# Patient Record
Sex: FEMALE | Race: WHITE | Employment: OTHER | URBAN - METROPOLITAN AREA
[De-identification: names, ages, dates, MRNs, and addresses within clinical notes are randomized per-mention and may not be internally consistent; named-entity substitution may affect disease eponyms.]

---

## 2022-06-25 ENCOUNTER — HOSPITAL ENCOUNTER (EMERGENCY)
Age: 67
Discharge: HOME | End: 2022-06-26
Payer: MEDICARE

## 2022-06-25 VITALS
OXYGEN SATURATION: 96 % | DIASTOLIC BLOOD PRESSURE: 78 MMHG | WEIGHT: 190 LBS | RESPIRATION RATE: 16 BRPM | SYSTOLIC BLOOD PRESSURE: 124 MMHG | HEART RATE: 89 BPM | HEIGHT: 67 IN | TEMPERATURE: 98 F | BODY MASS INDEX: 29.82 KG/M2

## 2022-06-25 DIAGNOSIS — S01.311A LACERATION OF RIGHT EAR LOBE, INITIAL ENCOUNTER: Primary | ICD-10-CM

## 2022-06-25 PROCEDURE — 12001 RPR S/N/AX/GEN/TRNK 2.5CM/<: CPT

## 2022-06-25 PROCEDURE — 99282 EMERGENCY DEPT VISIT SF MDM: CPT

## 2022-06-25 PROCEDURE — 12011 RPR F/E/E/N/L/M 2.5 CM/<: CPT

## 2022-06-25 RX ORDER — RIVAROXABAN 10 MG/1
10 TABLET, FILM COATED ORAL
COMMUNITY

## 2022-06-25 ASSESSMENT — PAIN - FUNCTIONAL ASSESSMENT: PAIN_FUNCTIONAL_ASSESSMENT: NONE - DENIES PAIN

## 2022-06-26 NOTE — ED PROVIDER NOTES
**ADVANCED PRACTICE PROVIDER, I HAVE EVALUATED THIS [facility]  ED  EMERGENCY DEPARTMENT ENCOUNTER      Pt Name: Oziel Yun  [facility]:[id number]  Janie 1955  Date of evaluation: 6/25/2022  Provider: KRISTINA Dallas      Chief Complaint:    Chief Complaint   Patient presents with    Laceration     earing caught by dog, pulled out, ripped right ear          Nursing Notes, Past Medical Hx, Past Surgical Hx, Social Hx, Allergies, and Family Hx were all reviewed and agreed with or any disagreements were addressed in the HPI.    HPI: (Location, Duration, Timing, Severity, Quality, Assoc Sx, Context, Modifying factors)    Chief Complaint of laceration to right earlobe. This is a  73 y.o. female who presents via private vehicle stating a dog jumped up playfully towards her head and caught her earring and pulled it out. This ripped her earlobe. This happened about an hour ago. The dog is up-to-date on all vaccines. The patient is up-to-date on tetanus. She denies any pain at this time. She has not taken anything for pain. She denies any other injuries. PastMedical/Surgical History:  History reviewed. No pertinent past medical history. History reviewed. No pertinent surgical history. Medications:  Discharge Medication List as of 6/26/2022 12:35 AM      CONTINUE these medications which have NOT CHANGED    Details   rivaroxaban (XARELTO) 10 MG TABS tablet Take 10 mg by mouthHistorical Med               Review of Systems:  (2-9 systems needed)  Review of Systems    \"Positives and Pertinent negatives as per HPI\"    Physical Exam:  Physical Exam  Vitals and nursing note reviewed. Constitutional:       Appearance: Normal appearance. She is not diaphoretic. HENT:      Head: Normocephalic and atraumatic. Ears:        Comments: Full thickness laceration of right ear lobe. Repair demonstrated above.       Nose: Nose normal.   Eyes:      General:         Right eye: No discharge. Left eye: No discharge. Pulmonary:      Effort: Pulmonary effort is normal. No respiratory distress. Musculoskeletal:         General: Normal range of motion. Cervical back: Normal range of motion and neck supple. Skin:     General: Skin is warm and dry. Coloration: Skin is not pale. Neurological:      Mental Status: She is alert and oriented to person, place, and time. Psychiatric:         Mood and Affect: Mood normal.         Behavior: Behavior normal.         MEDICAL DECISION MAKING    Vitals:    Vitals:    06/25/22 2133 06/25/22 2136   BP:  124/78   Pulse:  89   Resp:  16   Temp: 98 °F (36.7 °C)    TempSrc: Oral    SpO2:  96%   Weight: 190 lb (86.2 kg)    Height: 5' 7\" (1.702 m)        LABS:Labs Reviewed - No data to display     Remainder of labs reviewed and were negative at this time or not returned at the time of this note. RADIOLOGY:   Non-plain film images such as CT, Ultrasound and MRI are read by the radiologist. RefKRISTINA Barber have directly visualized the radiologic plain film image(s) with the below findings:      Interpretation per the Radiologist below, if available at the time of this note:    No orders to display        No results found. MEDICAL DECISION MAKING / ED COURSE:      PROCEDURES:   The risks and benefits of laceration repair were discussed with the patient. Questions were sought and answered and verbal consent was given for the procedure. The area was prepped and draped in standard bedside fashion. An auricular block was performed using  3ml of Lidocaine 2% without epinephrine without added sodium bicarbonate. The wound was explored with No foreign bodies found. The wound was repaired with 6-0 Ethilon; 10 simple interupted sutures were used. The patient tolerated the procedure well without complications and my repeat neurovascular exam post-procedure is unchanged. Wound care and scar minimization education was provided. Instructions were given to return for increasing pain, redness, streaking, discharge, or any other worsening or worrisome concerns. The patient was advised to follow-up with her primary care physician or return the emergency department for suture removal in 7 to 14 days. The patient tolerated their visit well. I evaluated the patient. The physician was available for consultation as needed. The patient and / or the family were informed of the results of any tests, a time was given to answer questions, a plan was proposed and they agreed with plan. CLINICAL IMPRESSION:  1. Laceration of right ear lobe, initial encounter      I estimate there is LOW risk for CELLULITIS, COMPARTMENT SYNDROME, NECROTIZING FASCIITIS, TENDON OR NEUROVASCULAR INJURY, or FOREIGN BODY, thus I consider the discharge disposition reasonable. Also, there is no evidence or peritonitis, sepsis, or toxicity. Oziel Yun and I have discussed the diagnosis and risks, and we agree with discharging home to follow-up with their primary doctor. We also discussed returning to the Emergency Department immediately if new or worsening symptoms occur. We have discussed the symptoms which are most concerning (e.g., changing or worsening pain, fever, numbness, weakness, cool or painful digits) that necessitate immediate return. Final Impression    1. Laceration of right ear lobe, initial encounter        Discharge Vital Signs:  Blood pressure 124/78, pulse 89, temperature 98 °F (36.7 °C), temperature source Oral, resp. rate 16, height 5' 7\" (1.702 m), weight 190 lb (86.2 kg), SpO2 96 %.     DISPOSITION Decision To Discharge 06/26/2022 12:28:42 AM      PATIENT REFERRED TO:  [facility]  ED  [address]  Go to   If symptoms worsen    MD Geovany SaraviaDign[id number]  [phone]      7-14 days For suture removal      DISCHARGE MEDICATIONS:  Discharge Medication List as of 6/26/2022 12:35 AM          DISCONTINUED MEDICATIONS:  Discharge Medication List as of 6/26/2022 12:35 AM                 (Please note the MDM and HPI sections of this note were completed with a voice recognition program.  Efforts were made to edit the dictations but occasionally words are mis-transcribed.)    Electronically signed, Joy Lamb,          Joy Lamb  06/26/22 0045